# Patient Record
Sex: MALE | Race: WHITE | HISPANIC OR LATINO | Employment: FULL TIME | URBAN - METROPOLITAN AREA
[De-identification: names, ages, dates, MRNs, and addresses within clinical notes are randomized per-mention and may not be internally consistent; named-entity substitution may affect disease eponyms.]

---

## 2024-06-10 ENCOUNTER — HOSPITAL ENCOUNTER (EMERGENCY)
Facility: CLINIC | Age: 39
Discharge: HOME | End: 2024-06-10
Attending: EMERGENCY MEDICINE | Admitting: EMERGENCY MEDICINE
Payer: OTHER MISCELLANEOUS

## 2024-06-10 VITALS
HEART RATE: 76 BPM | SYSTOLIC BLOOD PRESSURE: 126 MMHG | DIASTOLIC BLOOD PRESSURE: 90 MMHG | WEIGHT: 239.2 LBS | TEMPERATURE: 97.5 F | RESPIRATION RATE: 18 BRPM | OXYGEN SATURATION: 98 % | BODY MASS INDEX: 38.61 KG/M2

## 2024-06-10 DIAGNOSIS — S01.01XA LACERATION OF SCALP WITHOUT FOREIGN BODY, INITIAL ENCOUNTER: ICD-10-CM

## 2024-06-10 PROCEDURE — 90471 IMMUNIZATION ADMIN: CPT | Performed by: EMERGENCY MEDICINE

## 2024-06-10 PROCEDURE — 12001 RPR S/N/AX/GEN/TRNK 2.5CM/<: CPT

## 2024-06-10 PROCEDURE — 99283 EMERGENCY DEPT VISIT LOW MDM: CPT | Mod: 25

## 2024-06-10 PROCEDURE — 250N000011 HC RX IP 250 OP 636: Mod: JZ | Performed by: EMERGENCY MEDICINE

## 2024-06-10 PROCEDURE — 90715 TDAP VACCINE 7 YRS/> IM: CPT | Mod: JZ | Performed by: EMERGENCY MEDICINE

## 2024-06-10 RX ADMIN — CLOSTRIDIUM TETANI TOXOID ANTIGEN (FORMALDEHYDE INACTIVATED), CORYNEBACTERIUM DIPHTHERIAE TOXOID ANTIGEN (FORMALDEHYDE INACTIVATED), BORDETELLA PERTUSSIS TOXOID ANTIGEN (GLUTARALDEHYDE INACTIVATED), BORDETELLA PERTUSSIS FILAMENTOUS HEMAGGLUTININ ANTIGEN (FORMALDEHYDE INACTIVATED), BORDETELLA PERTUSSIS PERTACTIN ANTIGEN, AND BORDETELLA PERTUSSIS FIMBRIAE 2/3 ANTIGEN 0.5 ML: 5; 2; 2.5; 5; 3; 5 INJECTION, SUSPENSION INTRAMUSCULAR at 04:09

## 2024-06-10 ASSESSMENT — ACTIVITIES OF DAILY LIVING (ADL): ADLS_ACUITY_SCORE: 33

## 2024-06-10 ASSESSMENT — COLUMBIA-SUICIDE SEVERITY RATING SCALE - C-SSRS
1. IN THE PAST MONTH, HAVE YOU WISHED YOU WERE DEAD OR WISHED YOU COULD GO TO SLEEP AND NOT WAKE UP?: NO
6. HAVE YOU EVER DONE ANYTHING, STARTED TO DO ANYTHING, OR PREPARED TO DO ANYTHING TO END YOUR LIFE?: NO
2. HAVE YOU ACTUALLY HAD ANY THOUGHTS OF KILLING YOURSELF IN THE PAST MONTH?: NO

## 2024-06-10 NOTE — ED TRIAGE NOTES
Pt to ER w c/o head lac. Pt states he was at work and got hit in the head by piece of metal. Rates pain 7/10. Pt has 1 inch lac to head by hairline. Bleeding controlled. Unknown when last tetanus shot. VSS, ABCs intact, A&Ox4.

## 2024-06-10 NOTE — ED PROVIDER NOTES
History     Chief Complaint:  Head Laceration     HPI   Hasmukh Rachel is a 38 year old male who presents with a forehead laceration after standing up and hitting his head on a metal piece of equipment at work.  Unknown last tetanus status.  No loss of consciousness.  Bleeding is controlled.    Independent Historian:    Patient provides history above    Review of External Notes:  None    Medications:    No current outpatient medications on file.      Past Medical History:    No past medical history on file.    Past Surgical History:    No past surgical history on file.       Physical Exam   Patient Vitals for the past 24 hrs:   BP Temp Temp src Pulse Resp SpO2 Weight   06/10/24 0421 126/90 -- -- 76 18 98 % --   06/10/24 0348 133/103 97.5  F (36.4  C) Temporal 80 18 97 % --   06/10/24 0345 -- -- -- -- -- -- 108.5 kg (239 lb 3.2 oz)        Physical Exam  Nursing note and vitals reviewed.  Constitutional: Cooperative.  Sitting up comfortably in a chair.  HENT:   Laceration just inside the hairline of the left forehead.  Freely moving neck  Pulmonary/Chest: Effort normal.   Neurological: Alert.  Oriented x 3.  GCS 15  Skin: Skin is warm and dry.  See above  Psychiatric: Normal mood and affect.       Emergency Department Course     Procedures   Laceration repair  Location: Left forehead  Description: 0.9 cm linear laceration.  No foreign bodies.  Preparation: Wound was anesthetized using local infiltration of 1% lidocaine with epinephrine.  The skin was then cleaned using Shur-Clens scrubbing.  Technique: The wound was repaired in 1 layer using 2 simple interrupted 5-0 plain gut sutures.  No debridement.  Complications: None    Interventions:  Medications   Tdap (tetanus-diphtheria-acell pertussis) (ADACEL) injection 0.5 mL (0.5 mLs Intramuscular $Given 6/10/24 0400)      Assessments:  0420 patient evaluated in fast-track room #2.  Laceration repair as above    Independent Interpretation (X-rays, CTs, rhythm  strip):  None    Consultations/Discussion of Management or Tests:  None       Social Determinants of Health affecting care:  None     Disposition:  The patient was discharged.    Impression & Plan      Medical Decision Making:  This is a 38-year-old gentleman who presents with a simple laceration to his left forehead.  This was repaired in the primary fashion as per above.  Tetanus was updated.  He will be discharged home with appropriate wound care instructions    Diagnosis:    ICD-10-CM    1. Laceration of scalp without foreign body, initial encounter  S01.01XA               Mich Castro MD  06/10/24 0431